# Patient Record
Sex: FEMALE | Race: AMERICAN INDIAN OR ALASKA NATIVE
[De-identification: names, ages, dates, MRNs, and addresses within clinical notes are randomized per-mention and may not be internally consistent; named-entity substitution may affect disease eponyms.]

---

## 2018-10-21 ENCOUNTER — HOSPITAL ENCOUNTER (EMERGENCY)
Dept: HOSPITAL 5 - ED | Age: 28
Discharge: HOME | End: 2018-10-21
Payer: COMMERCIAL

## 2018-10-21 VITALS — SYSTOLIC BLOOD PRESSURE: 106 MMHG | DIASTOLIC BLOOD PRESSURE: 70 MMHG

## 2018-10-21 DIAGNOSIS — R11.2: ICD-10-CM

## 2018-10-21 DIAGNOSIS — R51: Primary | ICD-10-CM

## 2018-10-21 LAB
BILIRUB UR QL STRIP: (no result)
BLOOD UR QL VISUAL: (no result)
MUCOUS THREADS #/AREA URNS HPF: (no result) /HPF
PH UR STRIP: 6 [PH] (ref 5–7)
PROT UR STRIP-MCNC: (no result) MG/DL
RBC #/AREA URNS HPF: 2 /HPF (ref 0–6)
UROBILINOGEN UR-MCNC: < 2 MG/DL (ref ?–2)
WBC #/AREA URNS HPF: 1 /HPF (ref 0–6)

## 2018-10-21 PROCEDURE — 96361 HYDRATE IV INFUSION ADD-ON: CPT

## 2018-10-21 PROCEDURE — 81001 URINALYSIS AUTO W/SCOPE: CPT

## 2018-10-21 PROCEDURE — 96374 THER/PROPH/DIAG INJ IV PUSH: CPT

## 2018-10-21 PROCEDURE — 70450 CT HEAD/BRAIN W/O DYE: CPT

## 2018-10-21 PROCEDURE — 99284 EMERGENCY DEPT VISIT MOD MDM: CPT

## 2018-10-21 PROCEDURE — 96375 TX/PRO/DX INJ NEW DRUG ADDON: CPT

## 2018-10-21 NOTE — EMERGENCY DEPARTMENT REPORT
ED Headache HPI





- General


Chief Complaint: Headache


Stated Complaint: NAUSEA/HEADACHE/VOMITING


Time Seen by Provider: 10/21/18 11:59





- History of Present Illness


Initial Comments: 





Patient reports gradual onset of burning/tingling headache that started two 

days ago with nausea and vomiting. She reports this headache is different from 

previous headaches in the past. She has tried OTC Excedrin and Ibuprofen, 

however to no avail. She denies head trauma, closed spaces, sick contact, 

dizziness, nasal drainage, thunder clap, neck stiffness or fever


Timing/Duration: constant


Quality: severe, other (burning and tingling)


Recent Head Trauma: no recent headache/trauma, occasional headaches


Modifying Factors: improves with: cold therapy, rest.  worse with: movement


Associated Symptoms: nausea/vomiting.  denies: confusion, fatigue, facial pain, 

fever/chills, flushing, loss of consciousness, nasal congestion, nasal drainage

, numbness in legs/feet, rash, seizures, sinus infection, stiff neck, vision 

changes, weakness


Allergies/Adverse Reactions: 


Allergies





No Known Allergies Allergy (Verified 10/21/18 10:35)


 








Home Medications: 


Ambulatory Orders





Ranitidine HCl [Zantac] 300 mg PO QDAY #30 tablet 09/25/14 


traMADol [Ultram 50 MG tab] 50 mg PO Q6HR PRN #14 tablet 09/25/14 


Butalb/Acetamin/Caff -40 [Fioricet] 1 each PO Q4H PRN #20 tablet 10/21/18 


Ondansetron [Zofran Odt] 4 mg PO Q6HRT #8 tab.rapdis 10/21/18 











ED Review of Systems


ROS: 


Stated complaint: NAUSEA/HEADACHE/VOMITING


Other details as noted in HPI





Constitutional: denies: chills, fever


Eyes: denies: eye pain, eye discharge, vision change


ENT: denies: ear pain, throat pain


Respiratory: denies: cough, orthopnea, shortness of breath, SOB with exertion, 

SOB at rest, stridor, wheezing


Cardiovascular: denies: chest pain, palpitations


Endocrine: no symptoms reported


Gastrointestinal: nausea, vomiting.  denies: abdominal pain, diarrhea, 

constipation, hematemesis, melena


Genitourinary: denies: urgency, dysuria, discharge


Musculoskeletal: denies: back pain, joint swelling, arthralgia


Skin: denies: rash, lesions


Neurological: headache.  denies: weakness, paresthesias


Psychiatric: denies: anxiety, depression


Hematological/Lymphatic: denies: easy bleeding, easy bruising





ED Past Medical Hx





- Past Medical History


Previous Medical History?: No





- Surgical History


Past Surgical History?: No





- Social History


Smoking Status: Never Smoker


Substance Use Type: None





- Medications


Home Medications: 


 Home Medications











 Medication  Instructions  Recorded  Confirmed  Last Taken  Type


 


Ranitidine HCl [Zantac] 300 mg PO QDAY #30 tablet 09/25/14  Unknown Rx


 


traMADol [Ultram 50 MG tab] 50 mg PO Q6HR PRN #14 tablet 09/25/14  Unknown Rx


 


Butalb/Acetamin/Caff -40 1 each PO Q4H PRN #20 tablet 10/21/18  Unknown Rx





[Fioricet]     


 


Ondansetron [Zofran Odt] 4 mg PO Q6HRT #8 tab.rapdis 10/21/18  Unknown Rx














ED Physical Exam





- General


Limitations: No Limitations





ED Course


 Vital Signs











  10/21/18





  10:35


 


Temperature 98.4 F


 


Pulse Rate 67


 


Respiratory 18





Rate 


 


Blood Pressure 116/64


 


O2 Sat by Pulse 97





Oximetry 














- Reevaluation(s)


Reevaluation #1: 





10/21/18 12:20


antiemetic, NS, Benadryl, acetaminophen, laboratory and radiology studies 

ordered





ED Medical Decision Making





- Radiology Data


Radiology results: image reviewed





HISTORY: headache 





TECHNIQUE: Axial noncontrast CT images of the brain were 


performed 





Total exam .48 mGy-cm 





Comparison: None 





FINDINGS: 


Normal gray-white differentiation without midline shift or mass 


effect. 





No acute extra-axial fluid collection or intraparenchymal blood 


products. 





Ventricles and cisterns have normal size and configuration. 





Imaged posterior fossa is unremarkable. 





Conjugate gaze. 





Imaged paranasal sinuses are clear. 





No displaced calvarial fracture. 











IMPRESSION: 


No acute transcortical infarct, bleed, or mass identified. 





Clear imaged paranasal sinuses. 








- Medical Decision Making





During the course of ED, antiemetic, NS, Benadryl, acetaminophen, laboratory 

and radiology studies ordered. Radiology studies revealed No acute 

transcortical infarct, bleed, or mass identified. Clear imaged paranasal 

sinuses. Patient reports symptomatic relief from supportive therapy given in 

the ED. She was sent home with prescriptions for Fiorcet and Zofran, instructed 

to follow up with the selective referral given at discharge, she verbalized 

understanding





- Differential Diagnosis


Headache, Nausea/Vomting, SAH


Critical care attestation.: 


If time is entered above; I have spent that time in minutes in the direct care 

of this critically ill patient, excluding procedure time.








ED Disposition


Clinical Impression: 


Headache


Qualifiers:


 Headache type: unspecified Headache chronicity pattern: acute headache 

Intractability: not intractable Qualified Code(s): R51 - Headache





Nausea and vomiting


Qualifiers:


 Vomiting type: unspecified Vomiting Intractability: non-intractable Qualified 

Code(s): R11.2 - Nausea with vomiting, unspecified





Disposition: DC-01 TO HOME OR SELFCARE


Is pt being admited?: No


Does the pt Need Aspirin: No


Condition: Stable


Instructions:  Acute Headache (ED), Acute Nausea and Vomiting (ED)


Additional Instructions: 


Take medication as directed. Follow up with the selective referral given at 

discharge. Return back to the ED for worsening symptoms or concerns


Prescriptions: 


Butalb/Acetamin/Caff -40 [Fioricet] 1 each PO Q4H PRN #20 tablet


 PRN Reason: Headache


Ondansetron [Zofran Odt] 4 mg PO Q6HRT #8 tab.rapdis


Referrals: 


PRIMARY CARE,MD [Primary Care Provider] - 3-5 Days


KELLY MADRIGAL MD [Staff Physician] - 3-5 Days


LINDA BARNEY MD [Staff] - 3-5 Days


Forms:  Work/School Release Form(ED)


Time of Disposition: 14:08

## 2018-10-21 NOTE — CAT SCAN REPORT
FINAL REPORT



EXAM:  CT HEAD/BRAIN WO CON



HISTORY:  headache 



TECHNIQUE:  Axial noncontrast CT images of the brain were

performed



Total exam .48 mGy-cm



Comparison: None



FINDINGS:  

Normal gray-white differentiation without midline shift or mass

effect.



No acute extra-axial fluid collection or intraparenchymal blood

products.



Ventricles and cisterns have normal size and configuration.



Imaged posterior fossa is unremarkable.



Conjugate gaze.



Imaged paranasal sinuses are clear.



No displaced calvarial fracture.







IMPRESSION:  

No acute transcortical infarct, bleed, or mass identified.



Clear imaged paranasal sinuses.